# Patient Record
Sex: MALE | Race: WHITE
[De-identification: names, ages, dates, MRNs, and addresses within clinical notes are randomized per-mention and may not be internally consistent; named-entity substitution may affect disease eponyms.]

---

## 2020-03-21 ENCOUNTER — HOSPITAL ENCOUNTER (EMERGENCY)
Dept: HOSPITAL 47 - EC | Age: 60
LOS: 1 days | Discharge: HOME | End: 2020-03-22
Payer: COMMERCIAL

## 2020-03-21 DIAGNOSIS — Z20.828: ICD-10-CM

## 2020-03-21 DIAGNOSIS — R79.82: ICD-10-CM

## 2020-03-21 DIAGNOSIS — D72.829: ICD-10-CM

## 2020-03-21 DIAGNOSIS — J44.1: Primary | ICD-10-CM

## 2020-03-21 PROCEDURE — 86140 C-REACTIVE PROTEIN: CPT

## 2020-03-21 PROCEDURE — 99285 EMERGENCY DEPT VISIT HI MDM: CPT

## 2020-03-21 PROCEDURE — 36415 COLL VENOUS BLD VENIPUNCTURE: CPT

## 2020-03-21 PROCEDURE — 71045 X-RAY EXAM CHEST 1 VIEW: CPT

## 2020-03-21 PROCEDURE — 94640 AIRWAY INHALATION TREATMENT: CPT

## 2020-03-21 PROCEDURE — 85025 COMPLETE CBC W/AUTO DIFF WBC: CPT

## 2020-03-21 PROCEDURE — 80053 COMPREHEN METABOLIC PANEL: CPT

## 2020-03-21 PROCEDURE — 84145 PROCALCITONIN (PCT): CPT

## 2020-03-22 VITALS
DIASTOLIC BLOOD PRESSURE: 91 MMHG | RESPIRATION RATE: 16 BRPM | HEART RATE: 96 BPM | SYSTOLIC BLOOD PRESSURE: 148 MMHG | TEMPERATURE: 97.6 F

## 2020-03-22 LAB
ALBUMIN SERPL-MCNC: 4.9 G/DL (ref 3.5–5)
ALP SERPL-CCNC: 107 U/L (ref 38–126)
ALT SERPL-CCNC: 30 U/L (ref 4–49)
ANION GAP SERPL CALC-SCNC: 11 MMOL/L
AST SERPL-CCNC: 36 U/L (ref 17–59)
BASOPHILS # BLD AUTO: 0.1 K/UL (ref 0–0.2)
BASOPHILS NFR BLD AUTO: 1 %
BUN SERPL-SCNC: 26 MG/DL (ref 9–20)
CALCIUM SPEC-MCNC: 9.8 MG/DL (ref 8.4–10.2)
CHLORIDE SERPL-SCNC: 103 MMOL/L (ref 98–107)
CO2 SERPL-SCNC: 23 MMOL/L (ref 22–30)
EOSINOPHIL # BLD AUTO: 0.8 K/UL (ref 0–0.7)
EOSINOPHIL NFR BLD AUTO: 7 %
ERYTHROCYTE [DISTWIDTH] IN BLOOD BY AUTOMATED COUNT: 5.15 M/UL (ref 4.3–5.9)
ERYTHROCYTE [DISTWIDTH] IN BLOOD: 14 % (ref 11.5–15.5)
GLUCOSE SERPL-MCNC: 113 MG/DL (ref 74–99)
HCT VFR BLD AUTO: 46.2 % (ref 39–53)
HGB BLD-MCNC: 15.3 GM/DL (ref 13–17.5)
LYMPHOCYTES # SPEC AUTO: 2.1 K/UL (ref 1–4.8)
LYMPHOCYTES NFR SPEC AUTO: 18 %
MCH RBC QN AUTO: 29.8 PG (ref 25–35)
MCHC RBC AUTO-ENTMCNC: 33.1 G/DL (ref 31–37)
MCV RBC AUTO: 89.8 FL (ref 80–100)
MONOCYTES # BLD AUTO: 0.8 K/UL (ref 0–1)
MONOCYTES NFR BLD AUTO: 7 %
NEUTROPHILS # BLD AUTO: 7.7 K/UL (ref 1.3–7.7)
NEUTROPHILS NFR BLD AUTO: 66 %
PLATELET # BLD AUTO: 228 K/UL (ref 150–450)
POTASSIUM SERPL-SCNC: 4.1 MMOL/L (ref 3.5–5.1)
PROT SERPL-MCNC: 8 G/DL (ref 6.3–8.2)
SODIUM SERPL-SCNC: 137 MMOL/L (ref 137–145)
WBC # BLD AUTO: 11.7 K/UL (ref 3.8–10.6)

## 2020-03-22 NOTE — ED
SOB HPI





- General


Stated Complaint: SOB


Time Seen by Provider: 03/21/20 23:36





- History of Present Illness


Initial Comments: 


Matthias 59-year-old male with a history of COPD who presents the ER today for 

evaluation of shortness of breath.  Patient reports that he returned from [place]

8 days ago, he had been in [place] for 1 week.  He felt well while there, upon 

returning his wife did have a cough and his 7-year-old granddaughter who lives 

with him also had a cough.  Patient reports that over the past 3 days he's 

developed a nonproductive cough that similar to previous COPD exacerbations.  

Today the patient was using his home nebulizer with no improvement which 

prompted him to call 911 for transport to the hospital.  Patient denies any 

chest pain, palpitations, productive cough, he denies any fevers chills body 

aches or GI symptoms.








- Related Data


                                  Previous Rx's











 Medication  Instructions  Recorded


 


predniSONE [Deltasone] 40 mg PO DAILY 5 Days #10 tab 03/22/20











                                    Allergies











Allergy/AdvReac Type Severity Reaction Status Date / Time


 


No Known Allergies Allergy   Verified 03/22/20 00:15














Review of Systems


ROS Statement: 


Those systems with pertinent positive or pertinent negative responses have been 

documented in the HPI.





ROS Other: All systems not noted in ROS Statement are negative.





General Exam





- General Exam Comments


Initial Comments: 


Physical Exam


GENERAL:


Patient is well-developed and well-nourished.  Patient is nontoxic and well-

hydrated and is in no distress.





HENT:


Normocephalic, Atraumatic. 





EYES:


PERRL, EOMI





PULMONARY:


Expiratory wheezing in all lung fields





CARDIOVASCULAR:


There is a regular rate and rhythm without any murmurs gallops or rubs.  





ABDOMEN:


Soft and nontender with normal bowel sounds. 





SKIN:


Skin is clear with no lesions or rashes and otherwise unremarkable.





: 


Deferred





NEUROLOGIC:


Patient is alert and oriented x3.  Moving all extremities spontaneously





MUSCULOSKELETAL:


Normal extremities with adequate strength and full range of motion.  No lower 

extremity swelling or edema.  No calf tenderness.  





PSYCHIATRIC:


Normal psychiatric evaluation.








Course


                                   Vital Signs











  03/22/20 03/22/20 03/22/20





  00:11 00:41 00:55


 


Temperature 97.7 F  


 


Pulse Rate 110 H 100 108 H


 


Respiratory 18  





Rate   


 


Blood Pressure 160/97  


 


O2 Sat by Pulse 93 L  





Oximetry   














Medical Decision Making





- Medical Decision Making


The patient was seen and evaluated history is obtained from patient


History and physical exam consistent with a COPD exacerbation except patient 

does have international travel therefore possible exposure to rotavirus however 

his wife his had symptoms for 9-10 days at this point and his granddaughters had

 symptoms for nearly 2 weeks therefore I have a low suspicion for coronavirus.





Labs resulted with mild leukocytosis and elevated shin of CRP, I suspect that 

this is due to viral URI.  Again given the duration the patient's symptoms and 

low concern for coronavirus.  Patient's symptoms resolved after DuoNeb he is not

 hypoxic not tachycardic he remains afebrile.  At this time the patient and 

family are comfortable with the plan for discharge home.  Considering the recent

 travel I do recommend continued self-isolation in quarantine.  Close return 

parameters were discussed including any worsening shortness breath chest pain 

fever or any new or concerning symptoms.  Patient was discharged home in stable 

condition.








- Lab Data


Result diagrams: 


                                 03/22/20 00:00





                                 03/22/20 00:00


                                   Lab Results











  03/22/20 03/22/20 Range/Units





  00:00 00:00 


 


WBC  11.7 H   (3.8-10.6)  k/uL


 


RBC  5.15   (4.30-5.90)  m/uL


 


Hgb  15.3   (13.0-17.5)  gm/dL


 


Hct  46.2   (39.0-53.0)  %


 


MCV  89.8   (80.0-100.0)  fL


 


MCH  29.8   (25.0-35.0)  pg


 


MCHC  33.1   (31.0-37.0)  g/dL


 


RDW  14.0   (11.5-15.5)  %


 


Plt Count  228   (150-450)  k/uL


 


Neutrophils %  66   %


 


Lymphocytes %  18   %


 


Monocytes %  7   %


 


Eosinophils %  7   %


 


Basophils %  1   %


 


Neutrophils #  7.7   (1.3-7.7)  k/uL


 


Lymphocytes #  2.1   (1.0-4.8)  k/uL


 


Monocytes #  0.8   (0-1.0)  k/uL


 


Eosinophils #  0.8 H   (0-0.7)  k/uL


 


Basophils #  0.1   (0-0.2)  k/uL


 


Sodium   137  (137-145)  mmol/L


 


Potassium   4.1  (3.5-5.1)  mmol/L


 


Chloride   103  ()  mmol/L


 


Carbon Dioxide   23  (22-30)  mmol/L


 


Anion Gap   11  mmol/L


 


BUN   26 H  (9-20)  mg/dL


 


Creatinine   1.04  (0.66-1.25)  mg/dL


 


Est GFR (CKD-EPI)AfAm   >90  (>60 ml/min/1.73 sqM)  


 


Est GFR (CKD-EPI)NonAf   79  (>60 ml/min/1.73 sqM)  


 


Glucose   113 H  (74-99)  mg/dL


 


Calcium   9.8  (8.4-10.2)  mg/dL


 


Total Bilirubin   0.3  (0.2-1.3)  mg/dL


 


AST   36  (17-59)  U/L


 


ALT   30  (4-49)  U/L


 


Alkaline Phosphatase   107  ()  U/L


 


C-Reactive Protein   12.1 H  (<10.0)  mg/L


 


Total Protein   8.0  (6.3-8.2)  g/dL


 


Albumin   4.9  (3.5-5.0)  g/dL














Disposition


Clinical Impression: 


 COPD exacerbation





Disposition: HOME SELF-CARE


Condition: Stable


Instructions (If sedation given, give patient instructions):  Chronic Bronchitis

 (ED)


Prescriptions: 


predniSONE [Deltasone] 40 mg PO DAILY 5 Days #10 tab


Is patient prescribed a controlled substance at d/c from ED?: No


Referrals: 


Jordan Alvarado DO [Primary Care Provider] - 1-2 days

## 2020-03-22 NOTE — XR
EXAMINATION TYPE: XR chest 1V

 

DATE OF EXAM: 3/22/2020

 

COMPARISON: NONE

 

HISTORY:

cough

TECHNIQUE:

 

FINDINGS:

heart and mediastinum normal

atheromatous aorta

normal diaphragm. chest leads present. 

 

IMPRESSION: No active disease.